# Patient Record
Sex: MALE | Race: WHITE | NOT HISPANIC OR LATINO | Employment: FULL TIME | URBAN - METROPOLITAN AREA
[De-identification: names, ages, dates, MRNs, and addresses within clinical notes are randomized per-mention and may not be internally consistent; named-entity substitution may affect disease eponyms.]

---

## 2023-11-13 DIAGNOSIS — N52.9 ERECTILE DYSFUNCTION, UNSPECIFIED ERECTILE DYSFUNCTION TYPE: ICD-10-CM

## 2023-11-13 RX ORDER — TADALAFIL 20 MG/1
20 TABLET ORAL DAILY PRN
Qty: 90 TABLET | Refills: 0 | Status: SHIPPED | OUTPATIENT
Start: 2023-11-13 | End: 2024-02-11

## 2023-11-13 RX ORDER — TADALAFIL 20 MG/1
20 TABLET ORAL DAILY PRN
COMMUNITY
End: 2023-11-13 | Stop reason: SDUPTHER

## 2024-06-24 ENCOUNTER — APPOINTMENT (OUTPATIENT)
Dept: ORTHOPEDIC SURGERY | Facility: CLINIC | Age: 59
End: 2024-06-24
Payer: COMMERCIAL

## 2024-06-24 DIAGNOSIS — M77.12 LATERAL EPICONDYLITIS, LEFT ELBOW: ICD-10-CM

## 2024-06-24 PROCEDURE — 1036F TOBACCO NON-USER: CPT | Performed by: ORTHOPAEDIC SURGERY

## 2024-06-24 PROCEDURE — 99203 OFFICE O/P NEW LOW 30 MIN: CPT | Performed by: ORTHOPAEDIC SURGERY

## 2024-06-24 PROCEDURE — 76942 ECHO GUIDE FOR BIOPSY: CPT | Performed by: ORTHOPAEDIC SURGERY

## 2024-06-24 PROCEDURE — 20551 NJX 1 TENDON ORIGIN/INSJ: CPT | Performed by: ORTHOPAEDIC SURGERY

## 2024-06-24 ASSESSMENT — PAIN SCALES - GENERAL: PAINLEVEL_OUTOF10: 7

## 2024-06-24 ASSESSMENT — PAIN - FUNCTIONAL ASSESSMENT: PAIN_FUNCTIONAL_ASSESSMENT: 0-10

## 2024-06-25 RX ORDER — LIDOCAINE HYDROCHLORIDE 10 MG/ML
1 INJECTION INFILTRATION; PERINEURAL
Status: COMPLETED | OUTPATIENT
Start: 2024-06-24 | End: 2024-06-24

## 2024-06-25 RX ORDER — METHYLPREDNISOLONE ACETATE 40 MG/ML
30 INJECTION, SUSPENSION INTRA-ARTICULAR; INTRALESIONAL; INTRAMUSCULAR; SOFT TISSUE
Status: COMPLETED | OUTPATIENT
Start: 2024-06-24 | End: 2024-06-24

## 2024-06-25 ASSESSMENT — ENCOUNTER SYMPTOMS
FATIGUE: 0
SHORTNESS OF BREATH: 0
TROUBLE SWALLOWING: 0
ARTHRALGIAS: 1
RHINORRHEA: 0
FEVER: 0
WHEEZING: 0
COLOR CHANGE: 0
CHILLS: 0

## 2024-06-25 NOTE — PROGRESS NOTES
Reason for Appointment  Chief Complaint   Patient presents with    Left Elbow - Pain     History of Present Illness  New patient is a 58 y.o. male here today for evaluation of his left elbow.  He has a history of previous medial epicondylar surgery done by Dr. Easton and has done well.  He has had increased lateral sided left elbow pain, worse with gripping and lifting for the last few months.  Pain does radiate down the forearm but no numbness or tingling in the fingers.    Past Medical History:   Diagnosis Date    Acute upper respiratory infection, unspecified 09/26/2016    Acute URI    Body mass index (BMI) 32.0-32.9, adult     BMI 32.0-32.9,adult    Epigastric pain     Dyspepsia    Hyperlipidemia, unspecified 08/05/2021    Hyperlipidemia    Lower abdominal pain, unspecified 08/01/2014    Groin pain    Other conditions influencing health status     Pancreatitis    Other conditions influencing health status 05/30/2018    History of cough    Pain in unspecified lower leg 09/25/2014    Calf pain    Personal history of other diseases of the digestive system 04/02/2013    History of gastritis    Personal history of other diseases of the digestive system     History of gastritis    Personal history of other diseases of the musculoskeletal system and connective tissue 09/25/2014    History of muscle pain    Personal history of other diseases of the respiratory system 04/28/2018    History of bronchitis    Personal history of other diseases of the respiratory system 05/30/2018    History of acute bronchitis    Personal history of other endocrine, nutritional and metabolic disease     History of hyperlipidemia    Personal history of other infectious and parasitic diseases 03/04/2017    History of amebiasis    Personal history of other specified conditions 01/21/2019    History of fatigue    Personal history of other specified conditions 03/03/2017    History of diarrhea    Personal history of other specified conditions  2018    History of chest pain at rest    Pruritus, unspecified 10/24/2014    External auditory canal pruritus    Solitary pulmonary nodule 2016    Pulmonary nodule    Strain of adductor muscle, fascia and tendon of unspecified thigh, initial encounter 2014    Groin strain    Tension-type headache, unspecified, intractable 2018    Acute intractable tension-type headache       Past Surgical History:   Procedure Laterality Date    APPENDECTOMY  2013    Appendectomy    OTHER SURGICAL HISTORY  2018    Colonoscopy    OTHER SURGICAL HISTORY  2014    Elbow Surgery Incision       Medication Documentation Review Audit       Reviewed by Aminah Rollins PA-C (Physician Assistant) on 24 at 0844      Medication Order Taking? Sig Documenting Provider Last Dose Status   tadalafil 20 mg tablet [id number]  Take 1 tablet (20 mg) by mouth once daily as needed for erectile dysfunction (take 1 tablet prior to sexual activity). Take before activity Westley Castellanos MD   [id number]                    No Known Allergies    Review of Systems   Constitutional:  Negative for chills, fatigue and fever.   HENT:  Negative for rhinorrhea, sneezing and trouble swallowing.    Respiratory:  Negative for shortness of breath and wheezing.    Cardiovascular:  Negative for chest pain and leg swelling.   Musculoskeletal:  Positive for arthralgias.   Skin:  Negative for color change and pallor.     Exam   On exam patient is alert, awake, and in no acute distress.  Head is normocephalic, no JVD, no auditory wheezes.  He has good shoulder motion, pain with left elbow terminal flexion and extension.  Tender over the left lateral epicondyle and radial tunnel.  Well-healed previous medial scar.  Pain with resisted wrist extension on the left.  Good wrist and digital motion.  Skin is warm and dry without ulcerations, no other swelling lymphadenopathy.  Good pulses and sensation in the upper  extremity.    Assessment   Encounter Diagnosis   Name Primary?    Lateral epicondylitis, left elbow        Plan   We discussed conservative treatment today with an injection he would like to try some physical therapy.  We sterilely injected under ultrasound guidance Depo-Medrol lidocaine left lateral epicondylar region.  Patient understands the small risk of infection and the signs look for as well as flare reaction.  Hopefully this gives him good relief.  We did also discuss possible PRP injections in the future if symptoms return or worsen.  He can follow-up with us as needed.    Hand / UE Inj/Asp: L elbow for lateral epicondylitis on 6/24/2024 2:47 PM  Indications: pain  Details: 25 G needle, ultrasound-guided  Medications: 1 mL lidocaine 10 mg/mL (1 %); 30 mg methylPREDNISolone acetate 40 mg/mL  Outcome: tolerated well, no immediate complications    After discussing the risks and benefits of the procedure we proceeded with the injection. Using ultrasound guidance we identified lateral epicondyle, the extensor origin and the posterior interosseous nerve, images obtained. We sterilely injected a mixture of 30 mg of DepoMedrol and 1 cc of 1% lidocaine into the left lateral epicondyle. Pt tolerated the procedure well without any adverse effects.    Procedure, treatment alternatives, risks and benefits explained, specific risks discussed. Consent was given by the patient. Immediately prior to procedure a time out was called to verify the correct patient, procedure, equipment, support staff and site/side marked as required. Patient was prepped and draped in the usual sterile fashion.       Written by Aminah Shah saw, evaluated, and treated the patient with the PA

## 2024-07-09 ENCOUNTER — EVALUATION (OUTPATIENT)
Dept: OCCUPATIONAL THERAPY | Facility: CLINIC | Age: 59
End: 2024-07-09
Payer: COMMERCIAL

## 2024-07-09 DIAGNOSIS — M77.12 LATERAL EPICONDYLITIS, LEFT ELBOW: ICD-10-CM

## 2024-07-09 PROCEDURE — 97165 OT EVAL LOW COMPLEX 30 MIN: CPT | Mod: GO

## 2024-07-09 PROCEDURE — 97035 APP MDLTY 1+ULTRASOUND EA 15: CPT | Mod: GO

## 2024-07-09 NOTE — PROGRESS NOTES
"        Occupational Therapy  Occupational Therapy Orthopedic Evaluation    Patient Name: Constantine Verduzco \"Adeel"  MRN: [id number]  Today's Date: 7/10/2024  Time Calculation  Start Time: 1615  Stop Time: 1655  Time Calculation (min): 40 min    Insurance:  Visit number: 1 of 40  Authorization info: No auth   Insurance Type: Medical [place] [employer] [place]     General:  Reason for visit: L elbow pain   Referred by: Dr. Shah     Current Problem  1. Lateral epicondylitis, left elbow  Referral to Physical Therapy    Follow Up In Occupational Therapy          Precautions: none       Medical History Form: Reviewed (scanned into chart)    Subjective:   Chief Complaint: L lateral elbow pain after doing back and shoulder workout   Onset: 6/24/2024  YUSRA: Overuse . Working out         Hand Dominance: Right    Current Condition since injury:   same     PAIN     Location: L lateral elbow   Description: tender, sharp   3-4/10  Aggravating Factors: Lifting/Carrying , gripping   Relieving Factors:  Heat and Stretching     Relevant Information (PMH & Previous Tests/Imaging):   Previous Interventions/Treatments: Physical Therapy/Occupational Therapy     Prior Level of Function (PLOF)  Exercise/Physical Activity: Works out regularly   Work/School: [email or web address]   Current ADL/IADL Status: Independent      Patients Living Environment: Reviewed and no concern    Primary Language: English    There are no spiritual/cultural practices/values/needs that are important to know      Pt goals for therapy: Decrease pain to return to full workouts    Red Flags: Do you have any of the following? No  Fever/chills, unexplained weight changes, dizziness/fainting, unexplained change in bowel or bladder functions, unexplained malaise or muscle weakness, night pain/sweats, numbness or tingling    Objective:    Elbow AROM (degrees)   R L   Extension  0   Flexion  140   Pronation  90   Supination  90      Elbow PROM (degrees)   R L   Extension     Flexion   "   Pronation     Supination        Elbow Strength Measures (MMT)   R L   Extension  5   Flexion  5   Pronation  5   Supination  4          Special Tests    Tendonitis  Cozens Test: +  Wang Test: +  Maudsley's Test: -  Golfers Elbow Test: -  Pronator Teres Test: -  Hook Test: -  Nerve  Tinels Sign (Ulnar): -  Elbow Flexion Test: -  Radial Tunnel Test: -      Physical Observation:   Edema: none    Sensory: wnl  Numbness/Tingling: none          Outcome Measures:  UEFI: 58/80    EDUCATION: home exercise program, plan of care, activity modifications, pain management, and injury pathology       Goals:  1. Pt will report 0/10 L lateral elbow pain with gripping and lifting for ADL's and workouts   2. Pt will be independent with HEP  3. Pt will have 10 point improvement on UEFI      Plan of care was developed with input and agreement by the patient    Treatments:     Modalities:      8 min  Ultrasound to L elbow x 8 min @ 1.0 w/cm2 3 mhz, continuous     Therapeutic Exercise:    min       Manual Therapy:      min       Therapeutic Activity:     min       Neuromuscular Re-education:   min       Orthosis:       min      Wound Care:      min      Self Care:       min      Other Treatment:     min      Assessment: Patient is a 59 yo RHD male presenting with L lateral epicondylitis  resulting in limited participation in pain-free ADLs and inability to perform at their prior level of function. Pt would benefit from occupational therapy to address the impairments found & listed previously in the objective section in order to return to safe and pain-free ADLs and prior level of function.       Clinical Presentation: Stable and/or uncomplicated characteristics       Plan:      Planned Interventions include: therapeutic exercise, therapeutic activity, self-care home management, manual therapy, therapeutic activities, gait training, neuromuscular coordination, vasopneumatic, dry needling, aquatic therapy, electric stimulation,  fluidotherapy, ultrasound, kinesiotaping, orthosis fabrication, wound care  Frequency: 1 x Week  Duration: 8 Weeks  Rehab potential/prognosis: Good       Tera Doyle, OT

## 2024-07-16 ENCOUNTER — TREATMENT (OUTPATIENT)
Dept: OCCUPATIONAL THERAPY | Facility: CLINIC | Age: 59
End: 2024-07-16
Payer: COMMERCIAL

## 2024-07-16 DIAGNOSIS — M77.12 LATERAL EPICONDYLITIS, LEFT ELBOW: ICD-10-CM

## 2024-07-16 PROCEDURE — 97140 MANUAL THERAPY 1/> REGIONS: CPT | Mod: GO

## 2024-07-16 PROCEDURE — 97035 APP MDLTY 1+ULTRASOUND EA 15: CPT | Mod: GO

## 2024-07-16 PROCEDURE — 97110 THERAPEUTIC EXERCISES: CPT | Mod: GO

## 2024-07-16 NOTE — PROGRESS NOTES
"Occupational Therapy Treatment    Name: Constantine Verduzco \"Rogelio\"  MRN: [id number]  : 1965  Date: 24  Time Calculation  Start Time: 042  Stop Time: 0500  Time Calculation (min): 40 min  OT Therapeutic Procedures Time Entry  Manual Therapy Time Entry: 10  Therapeutic Exercise Time Entry: 22, OT Modalities Time Entry  Ultrasound Time Entry: 8  Insurance:  Visit number: 2 of 40  Authorization info: No auth   Insurance Type: Medical [place] [employer] [place]     Assessment: Pt is progressing well.  Continues to have pain L lateral elbow with gripping and with palpation.  Reports decreased pain with radial nerve glides and with STM      Plan: Continue with HEP and PRE's as tolerated        Subjective \"It's a little sore, but feels better when I do the radial nerve glides\"  General:        Pain Assessment:   4/10    Objective      Modalities:  Ultrasound L lateral forearm x 8 min @ 1.0 w/cm2, continuous, 100%     Communication:     Splinting:     Therapeutic Exercise   Radial nerve glides x 5  Wrist twist x 6x2 with 2 lbs   Eccentric wrist extension x 10x3 with 4 lbs  Wrist flexion x 10x3 with 4  lbs   Pro/sup x 10x3 with red flex bar  Manual Therapy   IASTM to wrist extensors/lateral epi  Wrist flexor and extensor stretches       Therapy/Activity:   Strength:     Other Activity:     Outcome Measures:      OP EDUCATION:       Goals:  1. Pt will report 0/10 L lateral elbow pain with gripping and lifting for ADL's and workouts   2. Pt will be independent with HEP  3. Pt will have 10 point improvement on UEFI                   "

## 2024-07-24 ENCOUNTER — TREATMENT (OUTPATIENT)
Dept: OCCUPATIONAL THERAPY | Facility: CLINIC | Age: 59
End: 2024-07-24
Payer: COMMERCIAL

## 2024-07-24 DIAGNOSIS — M77.12 LATERAL EPICONDYLITIS, LEFT ELBOW: ICD-10-CM

## 2024-07-24 PROCEDURE — 97140 MANUAL THERAPY 1/> REGIONS: CPT | Mod: GO

## 2024-07-24 PROCEDURE — 97035 APP MDLTY 1+ULTRASOUND EA 15: CPT | Mod: GO

## 2024-07-24 PROCEDURE — 97110 THERAPEUTIC EXERCISES: CPT | Mod: GO

## 2024-07-24 NOTE — PROGRESS NOTES
"Occupational Therapy Treatment    Name: Constantine Verduzco \"Rogelio\"  MRN: [id number]  : 1965  Date: 24  Time Calculation  Start Time: 1610  Stop Time: 1655  Time Calculation (min): 45 min  OT Therapeutic Procedures Time Entry  Manual Therapy Time Entry: 20  Therapeutic Exercise Time Entry: 17, OT Modalities Time Entry  Ultrasound Time Entry: 8  Insurance:  Visit number: 3 of 40  Authorization info: No auth   Insurance Type: Medical [place] [employer] [place]     Assessment: Pt is progressing well.  Continues to have pain L lateral elbow with gripping and with palpation.  Reports decreased pain with radial nerve glides and with STM      Plan: Continue with HEP and PRE's as tolerated        Subjective \"It' was feeling good but I think I overdid it on yesterday\"  General:        Pain Assessment:   2/10    Objective      Modalities:  Ultrasound L lateral forearm x 8 min @ 1.0 w/cm2, continuous, 100%     Communication:     Splinting:     Therapeutic Exercise     Wrist twist x 6x2 with 2 lbs   Gyroball x 2 min   Eccentric wrist extension x 10x3 with 4 lbs  Wrist flexion x 10x3 with 4  lbs   Pro/sup x 10x3 with red flex bar  Manual Therapy   IASTM to wrist extensors/lateral epi  Wrist flexor and extensor stretches   Ice massage x 5 min     Therapy/Activity:   Strength:     Other Activity:     Outcome Measures:      OP EDUCATION:       Goals:  1. Pt will report 0/10 L lateral elbow pain with gripping and lifting for ADL's and workouts   2. Pt will be independent with HEP  3. Pt will have 10 point improvement on UEFI                     "

## 2024-07-30 ENCOUNTER — TREATMENT (OUTPATIENT)
Dept: OCCUPATIONAL THERAPY | Facility: CLINIC | Age: 59
End: 2024-07-30
Payer: COMMERCIAL

## 2024-07-30 DIAGNOSIS — M77.12 LATERAL EPICONDYLITIS, LEFT ELBOW: ICD-10-CM

## 2024-07-30 PROCEDURE — 97035 APP MDLTY 1+ULTRASOUND EA 15: CPT | Mod: GO

## 2024-07-30 PROCEDURE — 97110 THERAPEUTIC EXERCISES: CPT | Mod: GO

## 2024-07-30 PROCEDURE — 97140 MANUAL THERAPY 1/> REGIONS: CPT | Mod: GO

## 2024-08-07 ENCOUNTER — TREATMENT (OUTPATIENT)
Dept: OCCUPATIONAL THERAPY | Facility: CLINIC | Age: 59
End: 2024-08-07
Payer: COMMERCIAL

## 2024-08-07 DIAGNOSIS — M77.12 LATERAL EPICONDYLITIS, LEFT ELBOW: ICD-10-CM

## 2024-08-07 PROCEDURE — 97110 THERAPEUTIC EXERCISES: CPT | Mod: GO

## 2024-08-07 PROCEDURE — 97035 APP MDLTY 1+ULTRASOUND EA 15: CPT | Mod: GO

## 2024-08-07 PROCEDURE — 97140 MANUAL THERAPY 1/> REGIONS: CPT | Mod: GO

## 2024-08-07 NOTE — PROGRESS NOTES
"Occupational Therapy    Occupational Therapy Treatment    Name: Constantine Verduzco \"Rogelio\"  MRN: [id number]  : 1965  Date: 24  Time Calculation  Start Time: 1520  Stop Time: 1600  Time Calculation (min): 40 min  OT Therapeutic Procedures Time Entry  Manual Therapy Time Entry: 10  Therapeutic Exercise Time Entry: 22, OT Modalities Time Entry  Ultrasound Time Entry: 8  Insurance:  Visit number: 6 of 40  Authorization info: No auth   Insurance Type: Medical [place] [employer] [place]     Assessment: Pt is progressing well.  Pain is improving.   Reports decreased pain with palpation over lateral epicondyle.       Plan: Continue with HEP and PRE's as tolerated        Subjective \"I felt it a little bit using the chain saw \"  General:        Pain Assessment:   2/10    Objective      Modalities:  Ultrasound L lateral forearm x 8 min @ 1.0 w/cm2, continuous, 100%     Communication:     Splinting:     Therapeutic Exercise     Wrist twist x 6x2 with 2 lbs   Gyroball x 2 min   Eccentric wrist extension x 10x3 with 5 lbs  Wrist flexion x 10x3 with 5  lbs   Pro/sup x 10x3 with red flex bar  Manual Therapy   IASTM to wrist extensors/lateral epi  Wrist flexor and extensor stretches   Ice massage x 5 min     Therapy/Activity:   Strength:     Other Activity:     Outcome Measures:      OP EDUCATION:       Goals:  1. Pt will report 0/10 L lateral elbow pain with gripping and lifting for ADL's and workouts   2. Pt will be independent with HEP  3. Pt will have 10 point improvement on UEFI                         "

## 2024-08-21 ENCOUNTER — TREATMENT (OUTPATIENT)
Dept: OCCUPATIONAL THERAPY | Facility: CLINIC | Age: 59
End: 2024-08-21
Payer: COMMERCIAL

## 2024-08-21 DIAGNOSIS — M77.12 LATERAL EPICONDYLITIS, LEFT ELBOW: ICD-10-CM

## 2024-08-21 PROCEDURE — 97110 THERAPEUTIC EXERCISES: CPT | Mod: GO

## 2024-08-21 PROCEDURE — 97035 APP MDLTY 1+ULTRASOUND EA 15: CPT | Mod: GO

## 2024-08-21 PROCEDURE — 97140 MANUAL THERAPY 1/> REGIONS: CPT | Mod: GO

## 2024-08-21 NOTE — PROGRESS NOTES
"Occupational Therapy    Occupational Therapy Treatment/Discharge Summary     Name: Constantine Vreduzco \"Adeel"  MRN: [id number]  : 1965  Date: 24  Time Calculation  Start Time: 1350  Stop Time: 1430  Time Calculation (min): 40 min  OT Therapeutic Procedures Time Entry  Manual Therapy Time Entry: 15  Therapeutic Exercise Time Entry: 17, OT Modalities Time Entry  Ultrasound Time Entry: 8  Insurance:  Visit number: 6 of 40  Authorization info: No auth   Insurance Type: Medical [place] [employer] [place]     Assessment: Pt is progressing well.  Pain is improving. Pt is independent with HEP      Plan: Discharge OT        Subjective \"It's not as bad as it was, but I feel it if I work it out really good.\"  General:          Objective    Cozens ; Negative   Mils: negative   Modalities:  Ultrasound L lateral forearm x 8 min @ 1.0 w/cm2, continuous, 100%     Communication:     Splinting:     Therapeutic Exercise     Wrist twist x 6x2 with 2 lbs   Eccentric wrist extension x 10x3 with 5 lbs  Wrist flexion x 10x3 with 5  lbs   Pro/sup x 10x3 with red flex bar  Manual Therapy   IASTM to wrist extensors/lateral epi  Wrist flexor and extensor stretches   Ice massage x 5 min     Therapy/Activity:   Strength:     Other Activity:     Outcome Measures:  UEFI:  66/80  OP EDUCATION:       Goals:  1. Pt will report 0/10 L lateral elbow pain with gripping and lifting for ADL's and workouts. Met   2. Pt will be independent with HEP. Met   3. Pt will have 10 point improvement on UEFI. Met                "

## 2024-08-26 ENCOUNTER — APPOINTMENT (OUTPATIENT)
Dept: ORTHOPEDIC SURGERY | Facility: CLINIC | Age: 59
End: 2024-08-26
Payer: COMMERCIAL

## 2024-08-26 DIAGNOSIS — M77.12 LATERAL EPICONDYLITIS OF LEFT ELBOW: Primary | ICD-10-CM

## 2024-08-26 PROCEDURE — 99213 OFFICE O/P EST LOW 20 MIN: CPT | Performed by: ORTHOPAEDIC SURGERY

## 2024-08-26 PROCEDURE — 1036F TOBACCO NON-USER: CPT | Performed by: ORTHOPAEDIC SURGERY

## 2024-08-26 ASSESSMENT — PAIN - FUNCTIONAL ASSESSMENT: PAIN_FUNCTIONAL_ASSESSMENT: 0-10

## 2024-08-26 ASSESSMENT — PAIN SCALES - GENERAL: PAINLEVEL_OUTOF10: 0 - NO PAIN

## 2024-08-26 NOTE — PROGRESS NOTES
Reason for Appointment  Chief Complaint   Patient presents with    Left Elbow - Pain, Follow-up     History of Present Illness  Patient is a 59 y.o. male here today for follow-up evaluation of left elbow pain, he did have an injection for lateral condyle lightest that did give him improvement.  He has had medial epicondylitis surgery in the past and this injection helped we talked about surgical intervention in the future no need for recurrent injection at this point we will want to see how much improvement he gets long-term.  Pain is worse with lifting better with rest no instability no other changes in his past medical history allergies and medications.     Past Medical History:   Diagnosis Date    Acute upper respiratory infection, unspecified 09/26/2016    Acute URI    Body mass index (BMI) 32.0-32.9, adult     BMI 32.0-32.9,adult    Epigastric pain     Dyspepsia    Hyperlipidemia, unspecified 08/05/2021    Hyperlipidemia    Lower abdominal pain, unspecified 08/01/2014    Groin pain    Other conditions influencing health status     Pancreatitis    Other conditions influencing health status 05/30/2018    History of cough    Pain in unspecified lower leg 09/25/2014    Calf pain    Personal history of other diseases of the digestive system 04/02/2013    History of gastritis    Personal history of other diseases of the digestive system     History of gastritis    Personal history of other diseases of the musculoskeletal system and connective tissue 09/25/2014    History of muscle pain    Personal history of other diseases of the respiratory system 04/28/2018    History of bronchitis    Personal history of other diseases of the respiratory system 05/30/2018    History of acute bronchitis    Personal history of other endocrine, nutritional and metabolic disease     History of hyperlipidemia    Personal history of other infectious and parasitic diseases 03/04/2017    History of amebiasis    Personal history of other  specified conditions 2019    History of fatigue    Personal history of other specified conditions 2017    History of diarrhea    Personal history of other specified conditions 2018    History of chest pain at rest    Pruritus, unspecified 10/24/2014    External auditory canal pruritus    Solitary pulmonary nodule 2016    Pulmonary nodule    Strain of adductor muscle, fascia and tendon of unspecified thigh, initial encounter 2014    Groin strain    Tension-type headache, unspecified, intractable 2018    Acute intractable tension-type headache       Past Surgical History:   Procedure Laterality Date    APPENDECTOMY  2013    Appendectomy    OTHER SURGICAL HISTORY  2018    Colonoscopy    OTHER SURGICAL HISTORY  2014    Elbow Surgery Incision       Medication Documentation Review Audit       Reviewed by Delio Shah MD (Physician) on 24 at 1245      Medication Order Taking? Sig Documenting Provider Last Dose Status   tadalafil 20 mg tablet [id number]  Take 1 tablet (20 mg) by mouth once daily as needed for erectile dysfunction (take 1 tablet prior to sexual activity). Take before activity Westley Castellanos MD   24 [id number]                    No Known Allergies    Review of Systems  Unchanged  Exam   On examination well-healed incision medially lateral side shows minimal tenderness of the lateral condyle no significant atrophy no instability of the collateral ligaments no ulnar or radial nerve symptoms today but some mild tenderness over the radial tunnel good pulses good sensation distally  Assessment   Left lateral epicondylitis    Plan   At this juncture we talked about as needed follow-up and the possibility of future surgical intervention and other treatments.  We talked about bracing and other modalities

## 2024-10-07 ENCOUNTER — TREATMENT (OUTPATIENT)
Dept: OCCUPATIONAL THERAPY | Facility: CLINIC | Age: 59
End: 2024-10-07
Payer: COMMERCIAL

## 2024-10-07 DIAGNOSIS — M77.12 LATERAL EPICONDYLITIS OF LEFT ELBOW: Primary | ICD-10-CM

## 2024-10-07 PROCEDURE — 97110 THERAPEUTIC EXERCISES: CPT | Mod: GO

## 2024-10-07 PROCEDURE — 97168 OT RE-EVAL EST PLAN CARE: CPT | Mod: GO

## 2024-10-07 NOTE — PROGRESS NOTES
"Occupational Therapy Treatment    Name: Constantine Verduzco \"Adeel"  MRN: [id number]  : 1965  Date: 10/7/2024  Time Calculation  Start Time: 1540  Stop Time: 1615  Time Calculation (min): 35 min  OT Therapeutic Procedures Time Entry  Therapeutic Exercise Time Entry: 12, OT Modalities Time Entry  Ultrasound Time Entry: 8    Current Problem:  Problem List Items Addressed This Visit             ICD-10-CM    Lateral epicondylitis of left elbow - Primary M77.12       Assessment: Pt presents with flare up of L lateral elbow pain since last week. Would benefit from further OT to address pain symptoms.     Plan:   OT 1x/week x 4 weeks    Subjective \"I must have over done it because I'm getting pain in my elbow again\"          Pain Assessment:   4/10 L lateral elbow     Objective    + hand shake test  + tenderness to palpation over lateral epicondyle  + cozen's test   Modalities:   US to L lateral elbow x 8 min @ 1.0 w/cm2, 3 mhz, continuous   Communication:     Splinting:     Therapeutic Exercise   Wrist flexor stretch with elbow extension overpressure x 10x3   Wrist twist with 2 lbs x 6x3   K-tape applied to lateral epicondyle with 75% stretch   Manual Therapy       Therapy/Activity:   Strength:     Other Activity:     Outcome Measures:      OP EDUCATION:   Pt instructed to avoid heavy lifting in gym and to wear wrist cock up at night.      Goals:    1. Pt will report 0/10 L lateral elbow pain with gripping and lifting for ADL's and workouts   2. Pt will be independent with HEP              "

## 2024-10-14 ENCOUNTER — TREATMENT (OUTPATIENT)
Dept: OCCUPATIONAL THERAPY | Facility: CLINIC | Age: 59
End: 2024-10-14
Payer: COMMERCIAL

## 2024-10-14 DIAGNOSIS — M77.12 LATERAL EPICONDYLITIS, LEFT ELBOW: ICD-10-CM

## 2024-10-14 PROCEDURE — 97110 THERAPEUTIC EXERCISES: CPT | Mod: GO

## 2024-10-14 PROCEDURE — 97140 MANUAL THERAPY 1/> REGIONS: CPT | Mod: GO

## 2024-10-21 ENCOUNTER — TREATMENT (OUTPATIENT)
Dept: OCCUPATIONAL THERAPY | Facility: CLINIC | Age: 59
End: 2024-10-21
Payer: COMMERCIAL

## 2024-10-21 DIAGNOSIS — M77.12 LATERAL EPICONDYLITIS OF LEFT ELBOW: Primary | ICD-10-CM

## 2024-10-21 DIAGNOSIS — M77.12 LATERAL EPICONDYLITIS, LEFT ELBOW: ICD-10-CM

## 2024-10-21 PROCEDURE — 97110 THERAPEUTIC EXERCISES: CPT | Mod: GO

## 2024-10-21 PROCEDURE — 97035 APP MDLTY 1+ULTRASOUND EA 15: CPT | Mod: GO

## 2024-10-21 NOTE — PROGRESS NOTES
"Occupational Therapy    Occupational Therapy Treatment    Name: Constantine Verduzco \"Rogelio\"  MRN: [id number]  : 1965  Date: 10/21/2024  Time Calculation  Start Time: 1550  Stop Time: 1623  Time Calculation (min): 33 min  OT Therapeutic Procedures Time Entry  Manual Therapy Time Entry: 5  Therapeutic Exercise Time Entry: 20, OT Modalities Time Entry  Ultrasound Time Entry: 8    Current Problem:  Problem List Items Addressed This Visit             ICD-10-CM    Lateral epicondylitis of left elbow - Primary M77.12     Other Visit Diagnoses         Codes    Lateral epicondylitis, left elbow     M77.12              Assessment: Pt reports slight improvement in pain L lateral elbow. No increase in pain with beginning PRE's     Plan:  Continue with icing, stretching, STM, and PRE's  Subjective \"It's  but a little better than last week\"          Pain Assessment:   3/10 L lateral elbow     Objective    Modalities:   US to L lateral elbow x 8 min @ 1.0 w/cm2, 3 mhz, continuous   Communication:     Splinting:     Therapeutic Exercise   Wrist flexor stretch with elbow extension overpressure x 10x3   Wrist twist with 2 lbs x 6x3   Gyroball x 3 min   Eccentric wrist ext x 10x3 with 5 lbs   Manual Therapy   IASTM to wrist extensors   Wrist extensor stretch     Therapy/Activity:   Strength:     Other Activity:     Outcome Measures:      OP EDUCATION:   Pt instructed to avoid heavy lifting in gym and to wear wrist cock up at night.      Goals:    1. Pt will report 0/10 L lateral elbow pain with gripping and lifting for ADL's and workouts   2. Pt will be independent with HEP                  "

## 2024-11-04 ENCOUNTER — TREATMENT (OUTPATIENT)
Dept: OCCUPATIONAL THERAPY | Facility: CLINIC | Age: 59
End: 2024-11-04
Payer: COMMERCIAL

## 2024-11-04 DIAGNOSIS — M77.12 LATERAL EPICONDYLITIS, LEFT ELBOW: ICD-10-CM

## 2024-11-04 PROCEDURE — 97110 THERAPEUTIC EXERCISES: CPT | Mod: GO

## 2024-11-04 NOTE — PROGRESS NOTES
"  Occupational Therapy    Occupational Therapy Treatment    Name: Constantine Verduzco \"Rogelio\"  MRN: [id number]  : 1965  Date: 2024  Time Calculation  Start Time: 1550  Stop Time: 1625  Time Calculation (min): 35 min  OT Therapeutic Procedures Time Entry  Manual Therapy Time Entry: 10  Therapeutic Exercise Time Entry: 25,      Current Problem:  Problem List Items Addressed This Visit    None  Visit Diagnoses         Codes    Lateral epicondylitis, left elbow     M77.12              Assessment: Pt reports slight improvement in pain L lateral elbow. No increase in pain with PRE's     Plan:  Continue with icing, stretching, STM, and PRE's    Subjective \"I still feel it a little when I  and extend my elbow \"          Pain Assessment:   2/10 L lateral elbow     Objective    Modalities:     Communication:     Splinting:     Therapeutic Exercise  Wrist twist with 2 lbs x 6x3   BFR- wrist flexion and extension 30,15,15,15 each 50% occlusion pressure with 5 lbs   Eccentric wrist ext x 10x3 with 5 lbs   Manual Therapy   IASTM to wrist extensors   Wrist extensor stretch     Therapy/Activity:   Strength:     Other Activity:     Outcome Measures:      OP EDUCATION:       Goals:    1. Pt will report 0/10 L lateral elbow pain with gripping and lifting for ADL's and workouts   2. Pt will be independent with HEP                  "

## 2024-11-18 ENCOUNTER — TREATMENT (OUTPATIENT)
Dept: OCCUPATIONAL THERAPY | Facility: CLINIC | Age: 59
End: 2024-11-18
Payer: COMMERCIAL

## 2024-11-18 DIAGNOSIS — M77.12 LATERAL EPICONDYLITIS, LEFT ELBOW: ICD-10-CM

## 2024-11-18 PROCEDURE — 97035 APP MDLTY 1+ULTRASOUND EA 15: CPT | Mod: GO

## 2024-11-18 PROCEDURE — 97140 MANUAL THERAPY 1/> REGIONS: CPT | Mod: GO

## 2024-11-18 PROCEDURE — 97110 THERAPEUTIC EXERCISES: CPT | Mod: GO

## 2024-11-18 NOTE — PROGRESS NOTES
"      Occupational Therapy    Occupational Therapy Treatment    Name: Constantine Verduzco \"Rogelio\"  MRN: [id number]  : 1965  Date: 2024  Time Calculation  Start Time: 1550  Stop Time: 1630  Time Calculation (min): 40 min  OT Therapeutic Procedures Time Entry  Manual Therapy Time Entry: 12  Therapeutic Exercise Time Entry: 20, OT Modalities Time Entry  Ultrasound Time Entry: 8    Current Problem:  Problem List Items Addressed This Visit    None  Visit Diagnoses         Codes    Lateral epicondylitis, left elbow     M77.12              Assessment: Pt reports improvement in pain L lateral elbow. Reports pain is now intermittent and not constant.  No increase in pain with PRE's     Plan:  Continue with icing, stretching, STM, and PRE's    Subjective \"I still feel it a little when I  and extend my elbow and it's not as constant  \"          Pain Assessment:   2/10 L lateral elbow     Objective    Modalities:   US x 8 min over lateral epi @ 1.0 w/cm2, 3MHZ, continuous   Communication:     Splinting:     Therapeutic Exercise  Wrist twist with 2 lbs x 6x3   BFR- wrist flexion and extension 30,15,15,15 each 50% occlusion pressure with 5 lbs   Eccentric wrist ext x 10x3 with 5 lbs   Finger extension x 15x3 with light resistance   Manual Therapy   IASTM to wrist extensors   Wrist extensor stretch     Therapy/Activity:   Strength:     Other Activity:     Outcome Measures:      OP EDUCATION:       Goals:    1. Pt will report 0/10 L lateral elbow pain with gripping and lifting for ADL's and workouts   2. Pt will be independent with HEP                  "

## 2024-12-09 ENCOUNTER — TREATMENT (OUTPATIENT)
Dept: OCCUPATIONAL THERAPY | Facility: CLINIC | Age: 59
End: 2024-12-09
Payer: COMMERCIAL

## 2024-12-09 DIAGNOSIS — M77.12 LATERAL EPICONDYLITIS, LEFT ELBOW: ICD-10-CM

## 2024-12-09 PROCEDURE — 97110 THERAPEUTIC EXERCISES: CPT | Mod: GO

## 2024-12-09 PROCEDURE — 97140 MANUAL THERAPY 1/> REGIONS: CPT | Mod: GO

## 2024-12-09 PROCEDURE — 97035 APP MDLTY 1+ULTRASOUND EA 15: CPT | Mod: GO

## 2024-12-09 NOTE — PROGRESS NOTES
"        Occupational Therapy    Occupational Therapy Treatment/Discharge Summary     Name: Constantine Verduzco \"Adeel"  MRN: [id number]  : 1965  Date: 2024  Time Calculation  Start Time: 1550  Stop Time: 1630  Time Calculation (min): 40 min  OT Therapeutic Procedures Time Entry  Manual Therapy Time Entry: 15  Therapeutic Exercise Time Entry: 17, OT Modalities Time Entry  Ultrasound Time Entry: 8    Current Problem:  Problem List Items Addressed This Visit    None  Visit Diagnoses         Codes    Lateral epicondylitis, left elbow     M77.12              Assessment: Pt reports improvement in pain L lateral elbow. Has achieved goals and is independent with HEP     Plan:  Discharge OT    Subjective \"It's feeling a lot better, it doesn't hurt all the time   \"          Pain Assessment:   0-1/10 L lateral elbow     Objective    Modalities:   US x 8 min over lateral epi @ 1.0 w/cm2, 3MHZ, continuous   Communication:     Splinting:     Therapeutic Exercise  Wrist twist with 2 lbs x 6x3   Red flexbar- wrist flex/ext and pro/sup x 15x2   Eccentric wrist ext x 10x3 with 5 lbs   Finger extension x 15x3 with light resistance   Manual Therapy   IASTM to wrist extensors   Wrist extensor stretch     Therapy/Activity:   Strength:     Other Activity:     Outcome Measures:      OP EDUCATION:       Goals:    1. Pt will report 0/10 L lateral elbow pain with gripping and lifting for ADL's and workouts. Met   2. Pt will be independent with HEP. Met                   "

## 2025-05-30 ENCOUNTER — TELEMEDICINE (OUTPATIENT)
Dept: UROLOGY | Facility: HOSPITAL | Age: 60
End: 2025-05-30
Payer: COMMERCIAL

## 2025-05-30 DIAGNOSIS — N52.9 ERECTILE DYSFUNCTION, UNSPECIFIED ERECTILE DYSFUNCTION TYPE: ICD-10-CM

## 2025-05-30 DIAGNOSIS — N52.03 COMBINED ARTERIAL INSUFFICIENCY AND CORPORO-VENOUS OCCLUSIVE ERECTILE DYSFUNCTION: Primary | ICD-10-CM

## 2025-05-30 PROCEDURE — 99203 OFFICE O/P NEW LOW 30 MIN: CPT | Performed by: UROLOGY

## 2025-05-30 RX ORDER — TADALAFIL 20 MG/1
20 TABLET ORAL DAILY PRN
Qty: 90 TABLET | Refills: 3 | Status: SHIPPED | OUTPATIENT
Start: 2025-05-30 | End: 2026-05-30

## 2025-05-30 NOTE — PROGRESS NOTES
NPV    Last seen - 10/25/21    Virtual or Telephone Consent    An interactive audio and video telecommunication system which permits real time communications between the patient (at the originating site) and provider (at the distant site) was utilized to provide this telehealth service.   Verbal consent was requested and obtained from Constantine Verduzco on this date, 05/30/25 for a telehealth visit and the patient's location was confirmed at the time of the visit.      HISTORY OF PRESENT ILLNESS:   Constantine Verduzco is a 59 y.o. male    Last seen in 2021    H/o ED  Issues obtaining and maintaining erections for years. Has tried Viagra in the past, has not worked well. Has done Gainswave and PRP. Also getting testosterone replacement through a men [facility] clinic. Notes increased curvature of erections in recent years, as well as decreased length and quality of erections. Wakes up with an erection in the morning, which goes away after urination. Urinating well.   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  Has made some lifestyle changes    PAST MEDICAL HISTORY:  Medical History[1]    PAST SURGICAL HISTORY:  Surgical History[2]     ALLERGIES:   Allergies[3]     MEDICATIONS:   Current Outpatient Medications   Medication Instructions    tadalafil 20 mg, oral, Daily PRN, Take before activity        PHYSICAL EXAM:  There were no vitals taken for this visit.  Constitutional: Patient appears well-developed and well-nourished. No distress.    Neurological: Alert and oriented to person, place, and time.  Psychiatric: Normal mood and affect. Behavior is normal. Thought content normal.      Labs  PSA 0.78    Lab Results   Component Value Date    CREATININE 0.95 08/06/2021     Lab Results   Component Value Date    CHOL 223 (H) 04/30/2021     Lab Results   Component Value Date    HDL 67.0  2021     Lab Results   Component Value Date    CHHDL 3.3 2021     Lab Results   Component Value Date    LDLF 114 (H) 2021     Lab Results   Component Value Date    VLDL 42 (H) 2021     Lab Results   Component Value Date    TRIG 212 (H) 2021     Lab Results   Component Value Date    TESTF 79.8 2018     Lab Results   Component Value Date    HCT 47.4 2021       Imaging    Procedures      Assessment:      1. Combined arterial insufficiency and corporo-venous occlusive erectile dysfunction             Plan:   1)  refilled tadalafil    PDE-5 inhibitor  The patient has been diagnosed with erectile dysfunction and cardiac assessment according to the [place] criteria allows use of oral PDE-5 inhibitor.  The patient understands that these medications are not initiators of erection and that he will still require sexual stimulation.  If prescribed vardenafil or sildenafil, he was advised to take the medication 30-60 minutes prior to sexual activity and that the efficacy of the medication is decreased following a high-fat meal.  The patient verbalized understanding that nitrates such as nitroglycerine, isosorbide mononitrate, isosorbide dinitrate and any other nitrate preparations are absolutely contraindicated with the use of a PDE-5 inhibitor. The patient was advised to alert any medical personal of PDE-5 inhibitor use should he seek urgent medical attention for any reason.  I explained the common adverse effects of therapy including, but not limited to headache, flushing, dizziness, rash, upset stomach, diarrhea, nasal congestion, abnormal vision, back pain, and myalgia.  Less common side effects are lightheadedness or blood pressure drop upon standing.   We discussed that patients have  after using medications in this class, and sometimes the exact cause of death was not able to be determined.  There is a very small risk of non-arteritic ischemic optic neuropathy, a rare cause  of blindness that may be permanent while using medications in this class.  Patient is instructed to immediately stop this medication and seek medical attention if he develops visual disturbances in one or both eyes.  We discussed that if he experiences erection lasting longer than four hours, he needs to seek immediate treatment at the emergency department as the longer he waits, the more damage that is done to the penile tissue.    The patient states that he is not withholding any information about his condition or the use of medications in order to receive a PDE-5 inhibitor class medication.  No barriers to learning were identified.  After all of the patient's questions were satisfactorily answered, he expressed understanding of the risks of therapy and wishes to proceed.        [1]   Past Medical History:  Diagnosis Date    Acute upper respiratory infection, unspecified 09/26/2016    Acute URI    Body mass index (BMI) 32.0-32.9, adult     BMI 32.0-32.9,adult    Epigastric pain     Dyspepsia    Hyperlipidemia, unspecified 08/05/2021    Hyperlipidemia    Lower abdominal pain, unspecified 08/01/2014    Groin pain    Other conditions influencing health status     Pancreatitis    Other conditions influencing health status 05/30/2018    History of cough    Pain in unspecified lower leg 09/25/2014    Calf pain    Personal history of other diseases of the digestive system 04/02/2013    History of gastritis    Personal history of other diseases of the digestive system     History of gastritis    Personal history of other diseases of the musculoskeletal system and connective tissue 09/25/2014    History of muscle pain    Personal history of other diseases of the respiratory system 04/28/2018    History of bronchitis    Personal history of other diseases of the respiratory system 05/30/2018    History of acute bronchitis    Personal history of other endocrine, nutritional and metabolic disease     History of hyperlipidemia     Personal history of other infectious and parasitic diseases 03/04/2017    History of amebiasis    Personal history of other specified conditions 01/21/2019    History of fatigue    Personal history of other specified conditions 03/03/2017    History of diarrhea    Personal history of other specified conditions 05/30/2018    History of chest pain at rest    Pruritus, unspecified 10/24/2014    External auditory canal pruritus    Solitary pulmonary nodule 05/09/2016    Pulmonary nodule    Strain of adductor muscle, fascia and tendon of unspecified thigh, initial encounter 08/01/2014    Groin strain    Tension-type headache, unspecified, intractable 11/05/2018    Acute intractable tension-type headache   [2]   Past Surgical History:  Procedure Laterality Date    APPENDECTOMY  04/25/2013    Appendectomy    OTHER SURGICAL HISTORY  12/19/2018    Colonoscopy    OTHER SURGICAL HISTORY  08/01/2014    Elbow Surgery Incision   [3] No Known Allergies